# Patient Record
Sex: FEMALE | Race: WHITE | NOT HISPANIC OR LATINO | URBAN - METROPOLITAN AREA
[De-identification: names, ages, dates, MRNs, and addresses within clinical notes are randomized per-mention and may not be internally consistent; named-entity substitution may affect disease eponyms.]

---

## 2017-03-06 ENCOUNTER — OUTPATIENT (OUTPATIENT)
Dept: OUTPATIENT SERVICES | Facility: HOSPITAL | Age: 72
LOS: 1 days | End: 2017-03-06

## 2017-10-25 ENCOUNTER — OFFICE VISIT (OUTPATIENT)
Dept: URGENT CARE | Facility: CLINIC | Age: 72
End: 2017-10-25
Payer: MEDICARE

## 2017-10-25 VITALS
DIASTOLIC BLOOD PRESSURE: 77 MMHG | TEMPERATURE: 98 F | RESPIRATION RATE: 17 BRPM | HEART RATE: 67 BPM | OXYGEN SATURATION: 98 % | BODY MASS INDEX: 24.45 KG/M2 | HEIGHT: 63 IN | SYSTOLIC BLOOD PRESSURE: 154 MMHG | WEIGHT: 138 LBS

## 2017-10-25 DIAGNOSIS — B02.9 HERPES ZOSTER WITHOUT COMPLICATION: Primary | ICD-10-CM

## 2017-10-25 PROCEDURE — 99203 OFFICE O/P NEW LOW 30 MIN: CPT | Mod: S$GLB,,, | Performed by: NURSE PRACTITIONER

## 2017-10-25 RX ORDER — OMEPRAZOLE 40 MG/1
40 CAPSULE, DELAYED RELEASE ORAL DAILY
COMMUNITY

## 2017-10-25 RX ORDER — CLOBETASOL PROPIONATE 0.46 MG/ML
SOLUTION TOPICAL 2 TIMES DAILY
COMMUNITY

## 2017-10-25 RX ORDER — VALACYCLOVIR HYDROCHLORIDE 1 G/1
1000 TABLET, FILM COATED ORAL 3 TIMES DAILY
Qty: 21 TABLET | Refills: 0 | Status: SHIPPED | OUTPATIENT
Start: 2017-10-25 | End: 2017-11-01

## 2017-10-25 RX ORDER — SIMVASTATIN 40 MG/1
40 TABLET, FILM COATED ORAL NIGHTLY
COMMUNITY

## 2017-10-25 RX ORDER — HYDROCHLOROTHIAZIDE 12.5 MG/1
12.5 CAPSULE ORAL DAILY
COMMUNITY

## 2017-10-25 NOTE — PATIENT INSTRUCTIONS
Please return here or go to the Emergency Department for any concerns or worsening of condition.  If you were prescribed antibiotics, please take them to completion.  If you were prescribed a narcotic medication, do not drive or operate heavy equipment or machinery while taking these medications.  Please follow up with your primary care doctor or specialist as needed.    If you  smoke, please stop smoking.  Shingles  Shingles is a viral infection caused by the same virus as chicken pox. Anyone who has had chicken pox may get shingles later in life. The virus stays in the body, but remains dormant (asleep). Shingles often occurs in older persons or persons with lowered immunity. But it can affect anyone at any age.  Shingles starts as a tingling patch of skin on one side of the body. Small, painful blisters may then appear. The rash does not spread to the rest of the body.  Exposure to shingles cannot cause shingles. However, it can cause chicken pox in anyone who has not had chicken pox or has not been vaccinated. The contagious period ends when all blisters have crusted over (generally about 2 weeks after the illness begins).  After the blisters heal, the affected skin may be sensitive or painful for months (neuralgia). This often gradually goes away.  A shingles vaccine is available. This can help prevent shingles or make it less painful. It is generally recommended for adults over the age of 60 who have had chicken pox in the past, but who have never had shingles. Adults over 60 who have had neither chicken pox nor shingles can prevent both diseases with the chicken pox vaccine. Ask your healthcare provider about these vaccines.  Home care  · Medicines may be prescribed to help relieve pain. Take these medicines as directed. Ask your healthcare provider or pharmacist before using over-the-counter medicines for helping treat pain and itching.  · In certain cases, antiviral medicines may be prescribed to reduce  pain, shorten the illness, and prevent neuralgia. Take these medicines as directed.  · Compresses made from a solution of cool water mixed with cornstarch or baking soda may help relieve pain and itching.   · Gently wash skin daily with soap and water to help prevent infection.  Be certain to rinse off all of the soap, which can be irritating.  · Trim fingernails and try not to scratch. Scratching the sores may leave scars.  · Stay home from work or school until all blisters have formed a crust and you are no longer contagious.  Follow-up care  Follow up with your healthcare provider or as directed by our staff.  When to seek medical advice  · Fever of 100.4°F (38°C) or higher, or as directed by your healthcare provider  · Affected skin is on the face or neck and any of the following occur:  ¨ Headache  ¨ Eye pain  ¨ Changes in vision  ¨ Sores near the eye  ¨ Weakness of facial muscles  · Pain, redness, or swelling of a joint  · Signs of skin infection: colored drainage from the sores, warmth, increasing redness, or increasing pain  Date Last Reviewed: 9/25/2015  © 8590-7375 [employer]. [address] [place] [address], [place] [address]. All rights reserved. This information is not intended as a substitute for professional medical care. Always follow your healthcare professional's instructions.

## 2017-10-25 NOTE — PROGRESS NOTES
"Subjective:       Patient ID: Tahmina Ross is a 72 y.o. female.    Vitals:  height is 5' 3" (1.6 m) and weight is 62.6 kg (138 lb). Her temperature is 98.1 °F (36.7 °C). Her blood pressure is 154/77 (abnormal) and her pulse is 67. Her respiration is 17 and oxygen saturation is 98%.     Chief Complaint: Rash    Pt states rash on back started on Sunday, 10/22.  Pt recently finished Cipro for UTI and broke in a rash on both lower extremities which started on 10/21.  Pt states rash is red, painful and itchy.  Pt has not taken anything over the counter.    Dr advised to d/c cipro due to breakout on legs.  Pt had shingles vaccine in 11/13.      Rash   This is a new problem. The current episode started in the past 7 days. The problem is unchanged. The affected locations include the back. The rash is characterized by burning, pain, redness and itchiness. She was exposed to a new medication (pt took cipro and broke out in a rash on both lower legs on saturday). Pertinent negatives include no fever, joint pain, shortness of breath or sore throat. Past treatments include nothing.     Review of Systems   Constitution: Negative for chills and fever.   HENT: Negative for sore throat.    Respiratory: Negative for shortness of breath.    Skin: Positive for itching and rash.   Musculoskeletal: Negative for joint pain.       Objective:      Physical Exam   Constitutional: She is oriented to person, place, and time. She appears well-developed and well-nourished. She is cooperative.  Non-toxic appearance. She does not appear ill. No distress.   HENT:   Head: Normocephalic and atraumatic.   Right Ear: Hearing, tympanic membrane, external ear and ear canal normal.   Left Ear: Hearing, tympanic membrane, external ear and ear canal normal.   Nose: Nose normal. No mucosal edema, rhinorrhea or nasal deformity. No epistaxis. Right sinus exhibits no maxillary sinus tenderness and no frontal sinus tenderness. Left sinus exhibits no " maxillary sinus tenderness and no frontal sinus tenderness.   Mouth/Throat: Uvula is midline, oropharynx is clear and moist and mucous membranes are normal. No trismus in the jaw. Normal dentition. No uvula swelling. No posterior oropharyngeal erythema.   Eyes: Conjunctivae and lids are normal. No scleral icterus.   Sclera clear bilat   Neck: Trachea normal, full passive range of motion without pain and phonation normal. Neck supple.   Cardiovascular: Normal rate, regular rhythm, normal heart sounds, intact distal pulses and normal pulses.    Pulmonary/Chest: Effort normal and breath sounds normal. No respiratory distress.   Abdominal: Soft. Normal appearance and bowel sounds are normal. She exhibits no distension. There is no tenderness.   Musculoskeletal: Normal range of motion. She exhibits no edema or deformity.   Neurological: She is alert and oriented to person, place, and time. She exhibits normal muscle tone. Coordination normal.   Skin: Skin is warm, dry and intact. Rash noted. Rash is vesicular. She is not diaphoretic. No pallor.        Psychiatric: She has a normal mood and affect. Her speech is normal and behavior is normal. Judgment and thought content normal. Cognition and memory are normal.   Nursing note and vitals reviewed.      Assessment:       1. Herpes zoster without complication        Plan:         Herpes zoster without complication  -     valACYclovir (VALTREX) 1000 MG tablet; Take 1 tablet (1,000 mg total) by mouth 3 (three) times daily.  Dispense: 21 tablet; Refill: 0    Please return here or go to the Emergency Department for any concerns or worsening of condition.  If you were prescribed antibiotics, please take them to completion.  If you were prescribed a narcotic medication, do not drive or operate heavy equipment or machinery while taking these medications.  Please follow up with your primary care doctor or specialist as needed.    If you  smoke, please stop smoking.

## 2019-04-23 PROBLEM — Z00.00 ENCOUNTER FOR PREVENTIVE HEALTH EXAMINATION: Status: ACTIVE | Noted: 2019-04-23

## 2019-04-26 ENCOUNTER — APPOINTMENT (OUTPATIENT)
Dept: CT IMAGING | Facility: CLINIC | Age: 74
End: 2019-04-26
Payer: MEDICARE

## 2019-04-26 PROCEDURE — 71250 CT THORAX DX C-: CPT

## 2019-04-27 ENCOUNTER — TRANSCRIPTION ENCOUNTER (OUTPATIENT)
Age: 74
End: 2019-04-27

## 2019-11-14 ENCOUNTER — TRANSCRIPTION ENCOUNTER (OUTPATIENT)
Age: 74
End: 2019-11-14

## 2019-12-13 ENCOUNTER — APPOINTMENT (OUTPATIENT)
Dept: RADIOLOGY | Facility: CLINIC | Age: 74
End: 2019-12-13
Payer: MEDICARE

## 2019-12-13 PROCEDURE — 74018 RADEX ABDOMEN 1 VIEW: CPT

## 2019-12-17 ENCOUNTER — APPOINTMENT (OUTPATIENT)
Dept: CT IMAGING | Facility: CLINIC | Age: 74
End: 2019-12-17
Payer: MEDICARE

## 2019-12-17 PROCEDURE — Q9967E: CUSTOM

## 2019-12-17 PROCEDURE — 82565A: CUSTOM | Mod: QW

## 2019-12-17 PROCEDURE — 74177 CT ABD & PELVIS W/CONTRAST: CPT

## 2021-06-22 ENCOUNTER — APPOINTMENT (OUTPATIENT)
Dept: ULTRASOUND IMAGING | Facility: CLINIC | Age: 76
End: 2021-06-22
Payer: MEDICARE

## 2021-06-22 PROCEDURE — 76770 US EXAM ABDO BACK WALL COMP: CPT

## 2021-08-11 ENCOUNTER — APPOINTMENT (OUTPATIENT)
Dept: HEMATOLOGY ONCOLOGY | Facility: CLINIC | Age: 76
End: 2021-08-11
Payer: MEDICARE

## 2021-08-11 VITALS
TEMPERATURE: 98.2 F | DIASTOLIC BLOOD PRESSURE: 82 MMHG | SYSTOLIC BLOOD PRESSURE: 146 MMHG | OXYGEN SATURATION: 98 % | HEART RATE: 74 BPM | WEIGHT: 138 LBS

## 2021-08-11 PROCEDURE — 99205 OFFICE O/P NEW HI 60 MIN: CPT

## 2021-08-11 PROCEDURE — 85025 COMPLETE CBC W/AUTO DIFF WBC: CPT

## 2021-08-16 LAB
ALBUMIN SERPL ELPH-MCNC: 4.4 G/DL
ALP BLD-CCNC: 117 U/L
ALT SERPL-CCNC: 21 U/L
ANION GAP SERPL CALC-SCNC: 13 MMOL/L
AST SERPL-CCNC: 29 U/L
BILIRUB SERPL-MCNC: 0.6 MG/DL
BUN SERPL-MCNC: 14 MG/DL
CALCIUM SERPL-MCNC: 9.7 MG/DL
CHLORIDE SERPL-SCNC: 100 MMOL/L
CO2 SERPL-SCNC: 22 MMOL/L
COVID-19 SPIKE DOMAIN ANTIBODY INTERPRETATION: POSITIVE
CREAT SERPL-MCNC: 1.02 MG/DL
DEPRECATED KAPPA LC FREE/LAMBDA SER: 0.83 RATIO
GLUCOSE SERPL-MCNC: 86 MG/DL
HBV CORE IGG+IGM SER QL: NONREACTIVE
HBV SURFACE AB SER QL: NONREACTIVE
HBV SURFACE AG SER QL: NONREACTIVE
HIV1+2 AB SPEC QL IA.RAPID: NONREACTIVE
KAPPA LC CSF-MCNC: 1.7 MG/DL
KAPPA LC SERPL-MCNC: 1.41 MG/DL
LDH SERPL-CCNC: 241 U/L
MAGNESIUM SERPL-MCNC: 2 MG/DL
PHOSPHATE SERPL-MCNC: 4.2 MG/DL
POTASSIUM SERPL-SCNC: 4.5 MMOL/L
PROT SERPL-MCNC: 7.1 G/DL
SARS-COV-2 AB SERPL IA-ACNC: 195 U/ML
SODIUM SERPL-SCNC: 135 MMOL/L
URATE SERPL-MCNC: 4.8 MG/DL

## 2021-08-16 NOTE — ASSESSMENT
[FreeTextEntry1] : Ms. 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 She is asymptomatic with no weight loss, fevers, chills, or night sweats.  She states that some of the palpable small LN have been there for decades, even predating her diagnosis.\par \par 1. MBL/CLL\par -  The patient was counseled extensively about her diagnosis, the importance of age-appropiate cancer screening, and follow-up. She is currently asymptomatic and can continue to be followed with repeat labs and physical examination. \par - Diagnosis to be confirmed on repeat labs. Labs today including CBC with diff, FISH, Flow, IGVH mutation analysis, serum immunofixation, immunoglobulins panel, SPEP, CMP, COVID-19 spike antibody, hepatitis B, HIV, Ig free light chains, LDH, Mg, Phosphorus, serum uric acid. Office will call in 1 week to discuss lab results ([phone])\par - Patient advised to bring up her MBL/CLL diagnosis with her OB/Gyn next week prior to her next mammogram for special attention to lymph nodes, especially given palpable left axillary node. \par - Repeat CBC with diff in 6 months. Follow-up with regional oncologist in 6 months. Follow-up here in 1 year. \par - Potential for research involvement discussed, will revisit in 6 months. \par \par 2. Abdominal bruit\par - Patient has a family history of AAA in her aunt. Advised to follow-up with her internist and Cardiologist. \par

## 2021-08-16 NOTE — HISTORY OF PRESENT ILLNESS
[de-identified] : Ms. Andrews is a 76 year-old female presenting for second-opinion consultation carrying a diagnosis of MLUS/CLL. She is asymptomatic with no weight loss, fevers, chills, or night sweats. \par \par Early in 2020, the patient saw Rheumatology (Dr. Jim) for OA. 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 She was referred to heme/onc at [facility] where repeat SPEP/ERIC was negative. Flow 07/2020 showed "kappa-monotypic CD5 positive B-cell population (0.4% of the total) with an immunophenotype typical of CLL/SLL" and she was diagnosed with MLUS/CLL.  \par \par She is up to date on vaccinations, including Prevnar-13, influenza and COVID-19 (Pfizer 2nd dose 2/16/21). She has not yet received Shingrix but plans to. \par Up to date on screening. Last colonoscopy 2012, next in 2022. Last mammogram July 2020, appt. next week to schedule. Last pap smear 4 y/o and advised to not get anymore per her ob/gyn. Skin checks yearly. \par \par MHx: \par Osteoarthritis\par Hypertension - Valsartan 320 mg QD, Carvedilol 12.5 BID, Norvasc 2.5 QD\par Hyperlipidemia - Simvastatin 20 mg QD\par Anxiety - Welbutrin QD, Xanax prn\par Pericardial effusion - follows with Cardiology. Last echo in May was trace per patient. \par GERD - Prilosec QD\par Atrophic vaginitis - Estradiol .01 g BID\par \par Meds: (other)\par Calcium, D3 with K2, d-mannose with cranactin, garden of life probiotic\par \par Allergies: Cipro, Nifedipine\par \par Family Hx:\par Father passed from lung cancer age 62. \par Maternal aunt with colon cancer diagnosed in 60's. \par Aunt with AAA. \par FHx HTN. \par \par Surgical Hx:\par Left hip replacement Aug. 2014. \par Cataract surgery 2017 b/l. \par \par Social:\par Lives in [place] alone in a >56 y/o community. . \par 2/3 children alive. 1 granddaughter.\par Teaches yoga in the community. \par Former smoker (50 pack years, quit in 2013). \par 1 glass of wine per week. \par Drinks >64 oz of water per day.

## 2021-08-16 NOTE — PHYSICAL EXAM
[Normal] : affect appropriate [Fully active, able to carry on all pre-disease performance without restriction] : Status 0 - Fully active, able to carry on all pre-disease performance without restriction [de-identified] : Abdominal bruit auscultated. No venous stasis changes or varices.  [de-identified] : Left axillary lymph node palpated, estimated to be 1-1.5 cm. Right inguinal shotty lymph node palpated. No cervical, right axillary, or left inguinal lymphadenopathy.

## 2021-08-17 ENCOUNTER — APPOINTMENT (OUTPATIENT)
Dept: HEMATOLOGY ONCOLOGY | Facility: CLINIC | Age: 76
End: 2021-08-17
Payer: MEDICARE

## 2021-08-17 ENCOUNTER — NON-APPOINTMENT (OUTPATIENT)
Age: 76
End: 2021-08-17

## 2021-08-17 DIAGNOSIS — C91.10 CHRONIC LYMPHOCYTIC LEUKEMIA OF B-CELL TYPE NOT HAVING ACHIEVED REMISSION: ICD-10-CM

## 2021-08-17 PROCEDURE — 99442: CPT | Mod: 95

## 2021-08-20 LAB
ALBUMIN MFR SERPL ELPH: 55.7 %
ALBUMIN SERPL-MCNC: 4 G/DL
ALBUMIN/GLOB SERPL: 1.3 RATIO
ALPHA1 GLOB MFR SERPL ELPH: 4.6 %
ALPHA1 GLOB SERPL ELPH-MCNC: 0.3 G/DL
ALPHA2 GLOB MFR SERPL ELPH: 12.4 %
ALPHA2 GLOB SERPL ELPH-MCNC: 0.9 G/DL
B-GLOBULIN MFR SERPL ELPH: 14 %
B-GLOBULIN SERPL ELPH-MCNC: 1 G/DL
DEPRECATED KAPPA LC FREE/LAMBDA SER: 0.83 RATIO
GAMMA GLOB FLD ELPH-MCNC: 0.9 G/DL
GAMMA GLOB MFR SERPL ELPH: 13.3 %
IGA SER QL IEP: 436 MG/DL
IGG SER QL IEP: 750 MG/DL
IGM SER QL IEP: 282 MG/DL
IGVH MUTATION ANALYSIS: ABNORMAL
INTERPRETATION SERPL IEP-IMP: NORMAL
KAPPA LC CSF-MCNC: 1.7 MG/DL
KAPPA LC SERPL-MCNC: 1.41 MG/DL
M PROTEIN SPEC IFE-MCNC: NORMAL
PROT SERPL-MCNC: 7.1 G/DL
PROT SERPL-MCNC: 7.1 G/DL

## 2021-09-05 PROBLEM — C91.10 CLL (CHRONIC LYMPHOCYTIC LEUKEMIA): Status: ACTIVE | Noted: 2021-08-11

## 2022-04-25 ENCOUNTER — TRANSCRIPTION ENCOUNTER (OUTPATIENT)
Age: 77
End: 2022-04-25

## 2022-08-27 ENCOUNTER — NON-APPOINTMENT (OUTPATIENT)
Age: 77
End: 2022-08-27

## 2022-10-03 ENCOUNTER — NON-APPOINTMENT (OUTPATIENT)
Age: 77
End: 2022-10-03

## 2022-12-01 ENCOUNTER — OFFICE (OUTPATIENT)
Dept: URBAN - METROPOLITAN AREA CLINIC 38 | Facility: CLINIC | Age: 77
Setting detail: OPHTHALMOLOGY
End: 2022-12-01
Payer: MEDICARE

## 2022-12-01 DIAGNOSIS — H26.493: ICD-10-CM

## 2022-12-01 DIAGNOSIS — Z96.1: ICD-10-CM

## 2022-12-01 DIAGNOSIS — H53.002: ICD-10-CM

## 2022-12-01 DIAGNOSIS — H35.372: ICD-10-CM

## 2022-12-01 PROCEDURE — 92014 COMPRE OPH EXAM EST PT 1/>: CPT | Performed by: OPHTHALMOLOGY

## 2022-12-01 PROCEDURE — 92134 CPTRZ OPH DX IMG PST SGM RTA: CPT | Performed by: OPHTHALMOLOGY

## 2022-12-01 ASSESSMENT — REFRACTION_MANIFEST
OD_CYLINDER: SPH
OU_VA: 20/20-
OD_SPHERE: -1.00
OS_ADD: +2.75
OS_AXIS: 060
OD_VA2: 20/30(J2)
OD_CYLINDER: SPH
OD_VA1: 20/20
OS_AXIS: 060
OD_VA2: 20/30(J2)
OD_VA1: 20/20
OS_ADD: +2.75
OD_SPHERE: -1.00
OS_VA1: 20/60+2
OD_ADD: +2.75
OU_VA: 20/20-
OS_CYLINDER: -0.75
OS_VA2: 20/30(J2)
OS_VA1: 20/60+2
OS_VA2: 20/30(J2)
OS_SPHERE: -0.50
OS_SPHERE: -0.50
OS_CYLINDER: -0.75
OD_ADD: +2.75

## 2022-12-01 ASSESSMENT — KERATOMETRY
METHOD_AUTO_MANUAL: AUTO
OS_K2POWER_DIOPTERS: 43.75
OD_K2POWER_DIOPTERS: 44.00
OS_K1POWER_DIOPTERS: 43.00
OS_AXISANGLE_DEGREES: 139
OD_K1POWER_DIOPTERS: 43.75
OD_AXISANGLE_DEGREES: 057

## 2022-12-01 ASSESSMENT — REFRACTION_CURRENTRX
OS_AXIS: 091
OS_OVR_VA: 20/
OS_ADD: +2.75
OD_SPHERE: -2.00
OS_ADD: +2.75
OS_CYLINDER: -0.75
OD_OVR_VA: 20/
OS_VPRISM_DIRECTION: PROGS
OS_VPRISM_DIRECTION: PROGS
OS_AXIS: 090
OS_OVR_VA: 20/
OD_VPRISM_DIRECTION: PROGS
OD_ADD: +2.75
OD_SPHERE: +1.75
OD_CYLINDER: SPHERE
OS_OVR_VA: 20/
OS_SPHERE: -0.75
OD_OVR_VA: 20/
OS_SPHERE: +1.75
OD_CYLINDER: SPH
OS_SPHERE: -0.50
OS_CYLINDER: -0.75
OD_ADD: +2.75
OD_SPHERE: -2.00
OD_VPRISM_DIRECTION: PROGS
OD_OVR_VA: 20/

## 2022-12-01 ASSESSMENT — SPHEQUIV_DERIVED
OS_SPHEQUIV: -0.875
OS_SPHEQUIV: -0.625
OD_SPHEQUIV: -0.875
OS_SPHEQUIV: -0.875

## 2022-12-01 ASSESSMENT — REFRACTION_AUTOREFRACTION
OD_SPHERE: -0.75
OS_SPHERE: +0.25
OD_CYLINDER: -0.25
OS_CYLINDER: -1.75
OS_AXIS: 067
OD_AXIS: 108

## 2022-12-01 ASSESSMENT — CONFRONTATIONAL VISUAL FIELD TEST (CVF)
OD_FINDINGS: FULL
OS_FINDINGS: FULL

## 2022-12-01 ASSESSMENT — PACHYMETRY
OS_CT_CORRECTION: -1
OS_CT_UM: 554

## 2022-12-01 ASSESSMENT — TONOMETRY
OS_IOP_MMHG: 13
OD_IOP_MMHG: 13

## 2022-12-01 ASSESSMENT — VISUAL ACUITY
OS_BCVA: 20/20-1
OD_BCVA: 20/60+2

## 2022-12-01 ASSESSMENT — AXIALLENGTH_DERIVED
OD_AL: 23.7968
OS_AL: 23.8849
OS_AL: 23.9852
OS_AL: 23.9852

## 2022-12-08 ENCOUNTER — NON-APPOINTMENT (OUTPATIENT)
Age: 77
End: 2022-12-08

## 2022-12-12 ENCOUNTER — NON-APPOINTMENT (OUTPATIENT)
Age: 77
End: 2022-12-12

## 2023-10-01 ENCOUNTER — NON-APPOINTMENT (OUTPATIENT)
Age: 78
End: 2023-10-01

## 2023-12-11 ENCOUNTER — OFFICE (OUTPATIENT)
Dept: URBAN - METROPOLITAN AREA CLINIC 38 | Facility: CLINIC | Age: 78
Setting detail: OPHTHALMOLOGY
End: 2023-12-11
Payer: MEDICARE

## 2023-12-11 DIAGNOSIS — H35.372: ICD-10-CM

## 2023-12-11 DIAGNOSIS — Z96.1: ICD-10-CM

## 2023-12-11 DIAGNOSIS — H52.4: ICD-10-CM

## 2023-12-11 DIAGNOSIS — H26.493: ICD-10-CM

## 2023-12-11 PROCEDURE — 92014 COMPRE OPH EXAM EST PT 1/>: CPT

## 2023-12-11 PROCEDURE — 92134 CPTRZ OPH DX IMG PST SGM RTA: CPT

## 2023-12-11 PROCEDURE — 92015 DETERMINE REFRACTIVE STATE: CPT

## 2023-12-11 ASSESSMENT — CONFRONTATIONAL VISUAL FIELD TEST (CVF)
OD_FINDINGS: FULL
OS_FINDINGS: FULL

## 2023-12-12 PROBLEM — H35.363 DRUSEN; BOTH EYES: Status: ACTIVE | Noted: 2023-12-11

## 2023-12-12 ASSESSMENT — REFRACTION_MANIFEST
OD_VA2: 20/30(J2)
OS_VA2: 20/40(J3)
OD_CYLINDER: -0.75
OS_ADD: +2.75
OD_SPHERE: -1.00
OD_AXIS: 110
OS_VA1: 20/60+2
OS_ADD: +2.75
OS_SPHERE: PLANO
OS_AXIS: 085
OU_VA: 20/20-
OD_VA2: 20/30(J2)
OD_CYLINDER: SPH
OS_VA2: 20/30(J2)
OS_AXIS: 075
OS_CYLINDER: -1.50
OS_VA1: 20/60+2
OD_SPHERE: -1.00
OU_VA: 20/20
OD_VA1: 20/20
OS_SPHERE: PLANO
OD_ADD: +2.75
OD_ADD: +2.75
OS_CYLINDER: -1.50
OD_VA1: 20/20

## 2023-12-12 ASSESSMENT — REFRACTION_CURRENTRX
OD_SPHERE: -2.00
OS_OVR_VA: 20/
OD_CYLINDER: SPH
OD_SPHERE: -2.00
OS_ADD: +2.75
OD_CYLINDER: SPHERE
OD_VPRISM_DIRECTION: PROGS
OS_CYLINDER: -0.75
OS_VPRISM_DIRECTION: PROGS
OD_ADD: +2.75
OS_ADD: +2.75
OS_OVR_VA: 20/
OD_OVR_VA: 20/
OS_CYLINDER: -0.75
OS_SPHERE: +1.75
OD_SPHERE: +1.75
OS_AXIS: 090
OD_ADD: +2.75
OS_SPHERE: -0.75
OS_AXIS: 089
OS_VPRISM_DIRECTION: PROGS
OS_OVR_VA: 20/
OS_SPHERE: -0.75
OD_OVR_VA: 20/
OD_OVR_VA: 20/
OD_VPRISM_DIRECTION: PROGS

## 2023-12-12 ASSESSMENT — REFRACTION_AUTOREFRACTION
OD_SPHERE: -0.50
OD_AXIS: 112
OD_CYLINDER: -0.50
OS_CYLINDER: -1.50
OS_SPHERE: 0.00
OS_AXIS: 075

## 2023-12-12 ASSESSMENT — SPHEQUIV_DERIVED
OS_SPHEQUIV: -0.75
OD_SPHEQUIV: -1.375
OD_SPHEQUIV: -0.75

## 2023-12-13 ENCOUNTER — APPOINTMENT (OUTPATIENT)
Dept: INFECTIOUS DISEASE | Facility: CLINIC | Age: 78
End: 2023-12-13
Payer: MEDICARE

## 2023-12-13 VITALS
WEIGHT: 138 LBS | BODY MASS INDEX: 26.06 KG/M2 | SYSTOLIC BLOOD PRESSURE: 160 MMHG | TEMPERATURE: 98.2 F | HEIGHT: 61 IN | DIASTOLIC BLOOD PRESSURE: 82 MMHG | OXYGEN SATURATION: 97 % | HEART RATE: 74 BPM

## 2023-12-13 PROCEDURE — 99205 OFFICE O/P NEW HI 60 MIN: CPT

## 2023-12-13 NOTE — REVIEW OF SYSTEMS
[Dysuria] : dysuria [Negative] : Psychiatric [Fever] : no fever [Chills] : no chills [Sore Throat] : no sore throat [Cough] : no cough [Abdominal Pain] : no abdominal pain [Diarrhea] : no diarrhea [Pelvic Pain] : no pelvic pain [Vaginal Discharge] : no vaginal discharge

## 2023-12-13 NOTE — PHYSICAL EXAM
[General Appearance - Alert] : alert [General Appearance - In No Acute Distress] : in no acute distress [Extraocular Movements] : extraocular movements were intact [Outer Ear] : the ears and nose were normal in appearance [Hearing Threshold Finger Rub Not [place]] : hearing was normal [Neck Appearance] : the appearance of the neck was normal [] : no respiratory distress [Respiration, Rhythm And Depth] : normal respiratory rhythm and effort [Auscultation Breath Sounds / Voice Sounds] : lungs were clear to auscultation bilaterally [Heart Rate And Rhythm] : heart rate was normal and rhythm regular [Heart Sounds] : normal S1 and S2 [Edema] : there was no peripheral edema [Abdomen Soft] : soft [Abdomen Tenderness] : non-tender [Costovertebral Angle Tenderness] : no CVA tenderness [Skin Color & Pigmentation] : normal skin color and pigmentation [No Focal Deficits] : no focal deficits [Oriented To Time, Place, And Person] : oriented to person, place, and time [Affect] : the affect was normal

## 2023-12-13 NOTE — ASSESSMENT
[FreeTextEntry1] : Pt is a 77yo F with hx of recurrent UTIs and ESBL E. coli in the past, who presents with 2 months of prolonged dysuria, increased urine odor and cloudiness, and the sensation of incomplete voiding, now with urine culture growing ESBL Klebsiella pneumoniae, no PO antibiotic options left.  [facility] cultures and sensitivities from 12/8/2023 were reviewed with the pt. Pt has failed multiple PO antibiotics and sensitivities are now showing resistance to PO options. She was advised to go to the ED in order to receive IV antibiotics. Pt was agreeable to go to the ED on Monday. She was advised to go sooner if she developed systemic signs such as fever or chills, or if she developed flank pain or worsening dysuria. Pt verbalized good understanding.  Patient seen with resident Dr Laws [Treatment Education] : treatment education [Treatment Adherence] : treatment adherence [Medical Care Issues] : medical care issues [Drug Interactions / Side Effects] : drug interactions/side effects [Disclosure of Diagnosis] : disclosure of diagnosis [Anticipatory Guidance] : anticipatory guidance

## 2023-12-13 NOTE — HISTORY OF PRESENT ILLNESS
[FreeTextEntry1] : Pt is a 77yo F with PMH of ESBL UTIs, hypertension, gout, and depression, who presents to the office today for persistent UTI since 10/2023. UTIs began years ago due to genitourinary symptoms of menopause and have been treated with oral antibiotics; she has also been taking vaginal estrogen cream. She recently went 1 year without UTIs, but symptoms of burning dysuria, increased urine odor and cloudiness, and the sensation of incomplete voiding returned in 10/2023. Workup was positive for ESBL E. coli. Since then, she has been treated with ceftin, bactrim, macrobid and most recently 3 doses of fosfomycin which ended on 12/5/2023 with partial relief and quick recurrence of urinary symptoms. She also had a dental procedure on 10/23/2023 for which she had been given 3 doses of prophylactic amoxicillin. She had gotten a KUB/Renal US with Urogyn Dr White a few years ago which she states was negative. Most recent culture on 12/8/2023 done at [facility] is growing ESBL Klebsiella pneumoniae. She denies fevers, chills, flank pain, abdominal pain, diarrhea.

## 2023-12-18 RX ORDER — OMEPRAZOLE MAGNESIUM 20 MG/1
TABLET, DELAYED RELEASE ORAL DAILY
Refills: 0 | Status: ACTIVE | COMMUNITY

## 2023-12-18 RX ORDER — VALSARTAN 320 MG/1
320 TABLET, COATED ORAL DAILY
Refills: 0 | Status: ACTIVE | COMMUNITY

## 2023-12-18 RX ORDER — AMLODIPINE BESYLATE 2.5 MG/1
2.5 TABLET ORAL DAILY
Refills: 0 | Status: ACTIVE | COMMUNITY

## 2023-12-18 RX ORDER — BACILLUS COAGULANS/INULIN 1B-250 MG
CAPSULE ORAL
Refills: 0 | Status: ACTIVE | COMMUNITY

## 2023-12-18 RX ORDER — ALPRAZOLAM 0.5 MG/1
0.5 TABLET ORAL
Refills: 0 | Status: ACTIVE | COMMUNITY

## 2023-12-18 RX ORDER — CLOBETASOL PROPIONATE 0.5 MG/G
0.05 CREAM TOPICAL
Refills: 0 | Status: ACTIVE | COMMUNITY

## 2023-12-18 RX ORDER — CARVEDILOL 12.5 MG/1
12.5 TABLET, FILM COATED ORAL TWICE DAILY
Refills: 0 | Status: ACTIVE | COMMUNITY

## 2023-12-18 RX ORDER — SIMVASTATIN 20 MG/1
20 TABLET, FILM COATED ORAL DAILY
Refills: 0 | Status: ACTIVE | COMMUNITY

## 2023-12-18 RX ORDER — ESTRADIOL 0.1 MG/D
0.1 PATCH, EXTENDED RELEASE TRANSDERMAL
Refills: 0 | Status: ACTIVE | COMMUNITY

## 2023-12-18 RX ORDER — BUPROPION HCL 200 MG
200 TABLET,SUSTAINED-RELEASE 12 HR ORAL DAILY
Refills: 0 | Status: ACTIVE | COMMUNITY

## 2024-01-03 ENCOUNTER — APPOINTMENT (OUTPATIENT)
Dept: INFECTIOUS DISEASE | Facility: CLINIC | Age: 79
End: 2024-01-03
Payer: MEDICARE

## 2024-01-03 VITALS
DIASTOLIC BLOOD PRESSURE: 68 MMHG | WEIGHT: 136 LBS | TEMPERATURE: 98.1 F | HEART RATE: 67 BPM | HEIGHT: 61 IN | BODY MASS INDEX: 25.68 KG/M2 | SYSTOLIC BLOOD PRESSURE: 130 MMHG | OXYGEN SATURATION: 99 %

## 2024-01-03 DIAGNOSIS — N39.0 URINARY TRACT INFECTION, SITE NOT SPECIFIED: ICD-10-CM

## 2024-01-03 DIAGNOSIS — R33.9 RETENTION OF URINE, UNSPECIFIED: ICD-10-CM

## 2024-01-03 DIAGNOSIS — R39.15 URGENCY OF URINATION: ICD-10-CM

## 2024-01-03 PROCEDURE — 99215 OFFICE O/P EST HI 40 MIN: CPT

## 2024-01-03 NOTE — HISTORY OF PRESENT ILLNESS
[FreeTextEntry1] :  Seen initially as new patient 12/13/23, I had advised hospitalization for ESBL UTI to set up for home IV antibiotics. [place] [facility] [place], started on Ertapenem, UA in hospital + nitrites, LE, UCx with ESBL K pneumoniae carbapenem-S gentamicin-S, otherwise resistant organism. Renal/Bladder US done in hospital was significant for high post void residual volume. Midline placed and patient discharged on IV Ertapenem. I spoke with patient on 12/22, she was still having symptoms of burning pain with urination, I extended course to 12/27/23 to complete 10 days total. Seen today for follow up. She said symptoms have completely resolved, she no longer has burning pain with urination or urinary urgency. Last UTI before this started in October 2023 was 1 year prior Sept/Oct 2022. She has upcoming appointment with Urogyn.   Initial note as below.  77yo F with PMH of ESBL UTIs, hypertension, gout, and depression, who presents to the office today for persistent UTI since 10/2023. UTIs began years ago due to genitourinary symptoms of menopause and have been treated with oral antibiotics; she has also been taking vaginal estrogen cream. She recently went 1 year without UTIs, but symptoms of burning dysuria, increased urine odor and cloudiness, and the sensation of incomplete voiding returned in 10/2023. Workup was positive for ESBL E. coli. Since then, she has been treated with ceftin, bactrim, macrobid and most recently 3 doses of fosfomycin which ended on 12/5/2023 with partial relief and quick recurrence of urinary symptoms. She also had a dental procedure on 10/23/2023 for which she had been given 3 doses of prophylactic amoxicillin. She had gotten a KUB/Renal US with Urogyn Dr White a few years ago which she states was negative. Most recent culture on 12/8/2023 done at [facility] is growing ESBL Klebsiella pneumoniae. She denies fevers, chills, flank pain, abdominal pain, diarrhea.

## 2024-01-03 NOTE — PHYSICAL EXAM
[General Appearance - Alert] : alert [General Appearance - In No Acute Distress] : in no acute distress [Extraocular Movements] : extraocular movements were intact [Hearing Threshold Finger Rub Not [place]] : hearing was normal [Neck Appearance] : the appearance of the neck was normal [] : no respiratory distress [Edema] : there was no peripheral edema [Abdomen Soft] : soft [FreeTextEntry1] : no suprapubic tenderness [Musculoskeletal - Swelling] : no joint swelling [Skin Lesions] : no skin lesions [No Focal Deficits] : no focal deficits [Affect] : the affect was normal

## 2024-01-03 NOTE — REVIEW OF SYSTEMS
[Fever] : no fever [Red Eyes] : eyes not red [Loss Of Hearing] : no hearing loss [Palpitations] : no palpitations [Shortness Of Breath] : no shortness of breath [Abdominal Pain] : no abdominal pain [Dysuria] : no dysuria [Pelvic Pain] : no pelvic pain [Joint Pain] : no joint pain [Skin Lesions] : no skin lesions [Confused] : no confusion [Easy Bleeding] : no tendency for easy bleeding

## 2024-01-03 NOTE — ASSESSMENT
[FreeTextEntry1] : Recurrent UTIs, ESBL K pneumoniae no PO option available--persistent infections since 10/2023 after getting Covid19, prior to this no UTIs for 1 year treated with 10 days IV Ertapenem 12/18-12/27/23 with resolution of symptoms dysuria, burning pain with urination, urinary urgency--symptoms have not yet recurred high post void residual in bladder [place] [facility] [place]--unclear if secondary to infection or if stasis is contributing to infections  Plan continue to monitor off abx repeat UA and UCx today--see if any persistent asymptomatic bacteriuria--patient will drop off sample later today referral for pelvic floor PT for urinary retention, urinary urgency, recurrent UTIs f/u with Urogyn if persistent UTIs will be a candidate for PO Fosfomycin 3g weekly suppressive therapy--will hold off for now pending workup by Urogyn, repeat UA, UCx results f/u prn  [Treatment Education] : treatment education [Treatment Adherence] : treatment adherence [Rx Dose / Side Effects] : Rx dose/side effects [Medical Care Issues] : medical care issues [Drug Interactions / Side Effects] : drug interactions/side effects [Disclosure of Diagnosis] : disclosure of diagnosis [Anticipatory Guidance] : anticipatory guidance

## 2024-01-03 NOTE — DATA REVIEWED
[FreeTextEntry1] :   Renal/Bladder US 12/18/23 TECHNIQUE: Sonography of the kidneys and bladder.  FINDINGS: Right kidney: 10.9 cm. No renal mass, hydronephrosis or calculi.  Left kidney: 10.6 cm. No renal mass, hydronephrosis or calculi.  Simple cyst in the upper pole measures 1 x 1 x 1 cm.  Urinary bladder: Distended. Prevoid bladder volume: 760 mL Postvoid bladder volume: 247 mL.  IMPRESSION: No renal mass, hydronephrosis or calculus is visualized sonographically.  There is large postvoid residual with bladder volume at twenty 47 mL. --- End of Report --- ALEA DAVE MD; Attending Radiologist This document has been electronically signed. Dec 18 2023  2:55PM

## 2024-01-31 ENCOUNTER — APPOINTMENT (OUTPATIENT)
Dept: RADIOLOGY | Facility: CLINIC | Age: 79
End: 2024-01-31
Payer: MEDICARE

## 2024-01-31 ENCOUNTER — APPOINTMENT (OUTPATIENT)
Dept: MRI IMAGING | Facility: CLINIC | Age: 79
End: 2024-01-31
Payer: MEDICARE

## 2024-01-31 PROCEDURE — 72040 X-RAY EXAM NECK SPINE 2-3 VW: CPT

## 2024-01-31 PROCEDURE — 72141 MRI NECK SPINE W/O DYE: CPT | Mod: MH

## 2024-06-03 ENCOUNTER — NON-APPOINTMENT (OUTPATIENT)
Age: 79
End: 2024-06-03

## 2024-06-03 VITALS — WEIGHT: 138 LBS | BODY MASS INDEX: 26.06 KG/M2 | HEIGHT: 61 IN

## 2024-06-03 DIAGNOSIS — Z87.891 PERSONAL HISTORY OF NICOTINE DEPENDENCE: ICD-10-CM

## 2024-06-03 NOTE — HISTORY OF PRESENT ILLNESS
[Former] : Former [TextBox_13] : Referred by Dr. [place]   Ms. MARCANO is a 79 year old female with a history of CLL.   She was called to review eligibility for Low-Dose CT lung cancer screening.  Reviewed and confirmed that the patient meets screening eligibility criteria:   79 years old   Smoking Status: Former smoker   Number of pack(s) per day: 1 Number of years smoked: 50 Number of pack years smokin Quit year:    No symptoms of lung cancer, including new cough, change in cough, hemoptysis, and unintentional weight loss.   No personal history of lung cancer.  History of lung cancer in a first degree relative, her father.  No history of lung disease or occupational exposures. [YearQuit] : 2013 [PacksperYear] : 50

## 2024-06-10 ENCOUNTER — APPOINTMENT (OUTPATIENT)
Dept: CT IMAGING | Facility: CLINIC | Age: 79
End: 2024-06-10
Payer: MEDICARE

## 2024-06-10 PROCEDURE — 71271 CT THORAX LUNG CANCER SCR C-: CPT

## 2024-07-03 ENCOUNTER — APPOINTMENT (OUTPATIENT)
Dept: MRI IMAGING | Facility: CLINIC | Age: 79
End: 2024-07-03
Payer: MEDICARE

## 2024-07-03 PROCEDURE — 72148 MRI LUMBAR SPINE W/O DYE: CPT | Mod: MH

## 2024-07-25 ENCOUNTER — NON-APPOINTMENT (OUTPATIENT)
Age: 79
End: 2024-07-25

## 2025-01-17 ENCOUNTER — OFFICE (OUTPATIENT)
Dept: URBAN - METROPOLITAN AREA CLINIC 38 | Facility: CLINIC | Age: 80
Setting detail: OPHTHALMOLOGY
End: 2025-01-17
Payer: MEDICARE

## 2025-01-17 DIAGNOSIS — Z96.1: ICD-10-CM

## 2025-01-17 DIAGNOSIS — H26.493: ICD-10-CM

## 2025-01-17 DIAGNOSIS — H35.363: ICD-10-CM

## 2025-01-17 DIAGNOSIS — H16.223: ICD-10-CM

## 2025-01-17 DIAGNOSIS — H53.002: ICD-10-CM

## 2025-01-17 DIAGNOSIS — H35.373: ICD-10-CM

## 2025-01-17 PROCEDURE — 92014 COMPRE OPH EXAM EST PT 1/>: CPT | Performed by: STUDENT IN AN ORGANIZED HEALTH CARE EDUCATION/TRAINING PROGRAM

## 2025-01-17 ASSESSMENT — REFRACTION_CURRENTRX
OD_CYLINDER: SPH
OD_OVR_VA: 20/
OS_OVR_VA: 20/
OD_CYLINDER: SPHERE
OS_SPHERE: -0.75
OS_OVR_VA: 20/
OD_SPHERE: +1.75
OS_AXIS: 090
OS_VPRISM_DIRECTION: PROGS
OS_CYLINDER: -0.75
OS_VPRISM_DIRECTION: PROGS
OD_VPRISM_DIRECTION: PROGS
OD_ADD: +2.75
OD_OVR_VA: 20/
OS_ADD: +2.75
OS_SPHERE: -0.75
OD_SPHERE: -2.00
OD_SPHERE: -2.00
OS_CYLINDER: -0.75
OS_SPHERE: +1.75
OD_OVR_VA: 20/
OS_OVR_VA: 20/
OS_ADD: +2.75
OD_ADD: +2.75
OS_AXIS: 089
OD_VPRISM_DIRECTION: PROGS

## 2025-01-17 ASSESSMENT — REFRACTION_AUTOREFRACTION
OS_AXIS: 067
OD_AXIS: 130
OS_CYLINDER: -1.00
OS_SPHERE: -0.50
OD_SPHERE: -0.75
OD_CYLINDER: -0.50

## 2025-01-17 ASSESSMENT — KERATOMETRY
OS_AXISANGLE_DEGREES: 154
METHOD_AUTO_MANUAL: AUTO
OD_K1POWER_DIOPTERS: 43.75
OS_K2POWER_DIOPTERS: 43.75
OD_AXISANGLE_DEGREES: 064
OS_K1POWER_DIOPTERS: 43.25
OD_K2POWER_DIOPTERS: 44.25

## 2025-01-17 ASSESSMENT — REFRACTION_MANIFEST
OS_CYLINDER: -1.50
OS_VA1: 20/60+2
OS_VA1: 20/60+2
OU_VA: 20/20-
OD_VA1: 20/20
OD_SPHERE: -1.00
OD_CYLINDER: -0.50
OD_ADD: +2.75
OS_AXIS: 085
OD_ADD: +2.75
OS_ADD: +2.75
OS_AXIS: 065
OU_VA: 20/20
OD_SPHERE: -0.75
OS_VA2: 20/30(J2)
OS_VA2: 20/40(J3)
OD_AXIS: 135
OS_SPHERE: PLANO
OS_ADD: +2.75
OD_VA2: 20/30(J2)
OD_VA1: 20/20
OD_CYLINDER: SPH
OS_CYLINDER: -1.25
OD_VA2: 20/30(J2)
OS_SPHERE: -0.25

## 2025-01-17 ASSESSMENT — SUPERFICIAL PUNCTATE KERATITIS (SPK)
OS_SPK: 1+
OD_SPK: 1+

## 2025-01-17 ASSESSMENT — VISUAL ACUITY
OD_BCVA: 20/60+2
OS_BCVA: 20/20

## 2025-01-17 ASSESSMENT — CONFRONTATIONAL VISUAL FIELD TEST (CVF)
OD_FINDINGS: FULL
OS_FINDINGS: FULL

## 2025-02-18 ENCOUNTER — OFFICE (OUTPATIENT)
Dept: URBAN - METROPOLITAN AREA CLINIC 38 | Facility: CLINIC | Age: 80
Setting detail: OPHTHALMOLOGY
End: 2025-02-18
Payer: MEDICARE

## 2025-02-18 DIAGNOSIS — H35.363: ICD-10-CM

## 2025-02-18 DIAGNOSIS — H53.002: ICD-10-CM

## 2025-02-18 DIAGNOSIS — H35.373: ICD-10-CM

## 2025-02-18 PROCEDURE — 92134 CPTRZ OPH DX IMG PST SGM RTA: CPT | Performed by: OPHTHALMOLOGY

## 2025-02-18 PROCEDURE — 92012 INTRM OPH EXAM EST PATIENT: CPT | Performed by: OPHTHALMOLOGY

## 2025-02-18 ASSESSMENT — REFRACTION_AUTOREFRACTION
OD_SPHERE: -0.75
OS_AXIS: 067
OD_AXIS: 130
OS_CYLINDER: -1.00
OS_SPHERE: -0.50
OD_CYLINDER: -0.50

## 2025-02-18 ASSESSMENT — CONFRONTATIONAL VISUAL FIELD TEST (CVF)
OS_FINDINGS: FULL
OD_FINDINGS: FULL

## 2025-02-18 ASSESSMENT — KERATOMETRY
METHOD_AUTO_MANUAL: AUTO
OS_K2POWER_DIOPTERS: 43.75
OS_AXISANGLE_DEGREES: 154
OS_K1POWER_DIOPTERS: 43.25
OD_AXISANGLE_DEGREES: 064
OD_K1POWER_DIOPTERS: 43.75
OD_K2POWER_DIOPTERS: 44.25

## 2025-02-18 ASSESSMENT — VISUAL ACUITY
OS_BCVA: 20/20
OD_BCVA: 20/60-1

## 2025-02-18 ASSESSMENT — SUPERFICIAL PUNCTATE KERATITIS (SPK)
OS_SPK: 1+
OD_SPK: 1+

## 2025-03-06 ENCOUNTER — APPOINTMENT (OUTPATIENT)
Dept: MRI IMAGING | Facility: CLINIC | Age: 80
End: 2025-03-06

## 2025-03-06 PROCEDURE — 73721 MRI JNT OF LWR EXTRE W/O DYE: CPT | Mod: RT

## 2025-03-28 ENCOUNTER — APPOINTMENT (OUTPATIENT)
Dept: MRI IMAGING | Facility: CLINIC | Age: 80
End: 2025-03-28
Payer: MEDICARE

## 2025-03-28 PROCEDURE — 73221 MRI JOINT UPR EXTREM W/O DYE: CPT | Mod: RT

## 2025-04-02 ENCOUNTER — TRANSCRIPTION ENCOUNTER (OUTPATIENT)
Age: 80
End: 2025-04-02

## 2025-08-06 ENCOUNTER — TRANSCRIPTION ENCOUNTER (OUTPATIENT)
Age: 80
End: 2025-08-06